# Patient Record
Sex: FEMALE | Race: WHITE | URBAN - METROPOLITAN AREA
[De-identification: names, ages, dates, MRNs, and addresses within clinical notes are randomized per-mention and may not be internally consistent; named-entity substitution may affect disease eponyms.]

---

## 2018-11-27 ENCOUNTER — THERAPY VISIT (OUTPATIENT)
Dept: PHYSICAL THERAPY | Facility: CLINIC | Age: 43
End: 2018-11-27
Payer: COMMERCIAL

## 2018-11-27 DIAGNOSIS — M25.561 RIGHT KNEE PAIN: Primary | ICD-10-CM

## 2018-11-27 DIAGNOSIS — M54.50 LUMBAGO: ICD-10-CM

## 2018-11-27 PROCEDURE — 97161 PT EVAL LOW COMPLEX 20 MIN: CPT | Mod: GP | Performed by: PHYSICAL THERAPIST

## 2018-11-27 PROCEDURE — 97110 THERAPEUTIC EXERCISES: CPT | Mod: GP | Performed by: PHYSICAL THERAPIST

## 2018-11-27 ASSESSMENT — ACTIVITIES OF DAILY LIVING (ADL)
KNEE_ACTIVITY_OF_DAILY_LIVING_SCORE: 77.14
HOW_WOULD_YOU_RATE_THE_OVERALL_FUNCTION_OF_YOUR_KNEE_DURING_YOUR_USUAL_DAILY_ACTIVITIES?: NEARLY NORMAL
RISE FROM A CHAIR: ACTIVITY IS NOT DIFFICULT
HOW_WOULD_YOU_RATE_THE_CURRENT_FUNCTION_OF_YOUR_KNEE_DURING_YOUR_USUAL_DAILY_ACTIVITIES_ON_A_SCALE_FROM_0_TO_100_WITH_100_BEING_YOUR_LEVEL_OF_KNEE_FUNCTION_PRIOR_TO_YOUR_INJURY_AND_0_BEING_THE_INABILITY_TO_PERFORM_ANY_OF_YOUR_USUAL_DAILY_ACTIVITIES?: 70
STIFFNESS: I HAVE THE SYMPTOM BUT IT DOES NOT AFFECT MY ACTIVITY
GIVING WAY, BUCKLING OR SHIFTING OF KNEE: THE SYMPTOM AFFECTS MY ACTIVITY MODERATELY
SWELLING: I HAVE THE SYMPTOM BUT IT DOES NOT AFFECT MY ACTIVITY
PAIN: THE SYMPTOM AFFECTS MY ACTIVITY SLIGHTLY
SIT WITH YOUR KNEE BENT: ACTIVITY IS NOT DIFFICULT
WALK: ACTIVITY IS NOT DIFFICULT
RAW_SCORE: 54
WEAKNESS: THE SYMPTOM AFFECTS MY ACTIVITY MODERATELY
GO UP STAIRS: ACTIVITY IS MINIMALLY DIFFICULT
KNEEL ON THE FRONT OF YOUR KNEE: ACTIVITY IS MINIMALLY DIFFICULT
SQUAT: ACTIVITY IS FAIRLY DIFFICULT
GO DOWN STAIRS: ACTIVITY IS MINIMALLY DIFFICULT
STAND: ACTIVITY IS NOT DIFFICULT
KNEE_ACTIVITY_OF_DAILY_LIVING_SUM: 54
LIMPING: I DO NOT HAVE THE SYMPTOM
AS_A_RESULT_OF_YOUR_KNEE_INJURY,_HOW_WOULD_YOU_RATE_YOUR_CURRENT_LEVEL_OF_DAILY_ACTIVITY?: NEARLY NORMAL

## 2018-11-27 NOTE — LETTER
[facility]  [address]  [facility] [address]  [phone]    2018    Re: Arianna Brock   :   1975  MRN:  [id number]   REFERRING PHYSICIAN:   Dr. Santos DC    [facility]  Date of Initial Evaluation:  2018  Visits:  Rxs Used: 1  Reason for Referral:     Right knee pain  Lumbago    EVALUATION SUMMARY    [facility] Initial Evaluation  Subjective:  Patient is a 42 year old female presenting with rehab right knee hpi and rehab back hpi. The history is provided by the patient. No  was used.   Arianna Brock is a 42 year old female with a right knee condition.  Condition occurred with:  Running.  Condition occurred: during recreation/sport.  This is a chronic condition  Pt reports R knee pain since  - running injury acute onset, pt was seen here for PT which helped, she has not return back to running since then, 1 yr ago her back started to hurt, gradual onset of low back pain, pt was working out last summer when her pain started, she has slowed down since then which has helped with her pain, pt was referred for PT by her chiropractor for lumbopelvic instability, upper crosse syndrome and R knee pain on 10/10/18, pt going to [facility] 2/month for the same, currently reports R knee pain and LBP which has been better since 1 month  Patient reports pain:  Anterior (superior patellar).  Radiates to:  Thigh (posteriorly).  Pain is described as sharp and is intermittent and reported as 4/10 and 5/10.  Associated symptoms:  Edema, loss of strength, loss of motion/stiffness and buckling/giving out. Pain is worse during the day.  Symptoms are exacerbated by ascending stairs, descending stairs, walking and bending/squatting (walknig tony >1 hr)   Since onset symptoms are gradually improving. General health as reported by patient is good.    Current occupation is Admin  assistant  This is a chronic condition     Patient reports pain:  Lower lumbar spine (L>R).  Radiates to:  Gluteals left.  Pain is described as aching and is intermittent and reported as 4/10.  Associated symptoms:  Loss of motion/stiffness. Pain is worse during the night and worse in the P.M. (wakes up occasionally - not everyday).  Symptoms are exacerbated by sitting, bending and standing (sitting tony 45 mins - sitting job of 6-8 hrs, standing tony 45 min) Relieved by: modifiying her activity has helped her pain level   Since onset symptoms are gradually improving. General health as reported by patient is good.  Pertinent medical history includes:  Asthma.              Pertinent medical history includes:  Asthma. Medication history: baclofen.  Current occupation is .  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    Knee Activity of Daily Living Score: 77.14      Re: Arianna Brock   :   1975    Objective:  Lumbar/SI Evaluation  ROM:    AROM Lumbar:   Flexion:          FIS - Able to reach ankles, B hamstring tightness   Ext:                    EIS - End range stretch pain L side    Side Bend:        Left:  L side pain     Right:  No pain   Rotation:           Left:     Right:   Side Glide:        Left:     Right:     Lumbar Myotomes:  normal       Knee Evaluation:  ROM:    AROM  Hyperextension:  Left:  0    Right: 0  Extension:  Left: 0    Right:  10  Flexion: Left: 148    Right: 143    Strength:   Extension:  Left: 5/5   Pain:      Right: 3+/5   Pain:  Quad Set Left: WNL    Pain:   Quad Set Right: Good    Pain:    Palpation:    Left knee tenderness present at:  Medial Joint Line; Lateral Joint Line; Patellar Tendon; Patellar Medial; Patellar Superior and Patellar Inferior  Right knee tenderness present at:  Medial Joint Line; Lateral Joint Line; Patellar Tendon; Patellar Superior and Patellar Inferior    Functional Testing:      Quad:    Single Leg Squat:  Left:         Requires wall supprot   Normal control  Right:      R knee pain  Mild loss of control  Bilateral Leg Squat:  R knee pain, partial ROM only        Assessment/Plan:    Patient is a 42 year old female with lumbar and right side knee complaints.    Patient has the following significant findings with corresponding treatment plan.                Diagnosis 1:  R knee pain, low back pain  Pain -  hot/cold therapy, manual therapy, self management, education, directional preference exercise and home program  Decreased ROM/flexibility - manual therapy, therapeutic exercise and home program  Decreased joint mobility - manual therapy and therapeutic exercise  Decreased strength - therapeutic exercise and therapeutic activities  Impaired muscle performance - neuro re-education  Decreased function - therapeutic activities  Impaired posture - neuro re-education    Re: Arianna Brock   :   1975      Therapy Evaluation Codes:   1) History comprised of:   Personal factors that impact the plan of care:      Time since onset of symptoms.    Comorbidity factors that impact the plan of care are:      Asthma.     Medications impacting care: baclofen.  2) Examination of Body Systems comprised of:   Body structures and functions that impact the plan of care:      Knee and Lumbar spine.   Activity limitations that impact the plan of care are:      Bending, Lifting, Sitting, Stairs, Standing and Walking.  3) Clinical presentation characteristics are:   Stable/Uncomplicated.  4) Decision-Making    Moderate complexity using standardized patient assessment instrument and/or measureable assessment of functional outcome.  Cumulative Therapy Evaluation is: Low complexity.    Previous and current functional limitations:  (See Goal Flow Sheet for this information)    Short term and Long term goals: (See Goal Flow Sheet for this information)     Communication ability:  Patient appears to be able to clearly communicate and understand  verbal and written communication and follow directions correctly.  Treatment Explanation - The following has been discussed with the patient:   RX ordered/plan of care  Anticipated outcomes  Possible risks and side effects  This patient would benefit from PT intervention to resume normal activities.   Rehab potential is excellent.    Frequency:  1 X week, once daily  Duration:  for 8 weeks  Discharge Plan:  Achieve all LTG.  Independent in home treatment program.  Return to previous functional level by discharge.  Reach maximal therapeutic benefit.      Thank you for your referral.    INQUIRIES  Therapist: Suzan Pagan   [facility] [place]  [address]  [facility] [address]  Phone: [phone]  Fax: [phone]

## 2018-11-27 NOTE — PROGRESS NOTES
[facility] Initial Evaluation  Subjective:  Patient is a 42 year old female presenting with rehab right knee hpi and rehab back hpi. The history is provided by the patient. No  was used.   Arianna Brock is a 42 year old female with a right knee condition.  Condition occurred with:  Running.  Condition occurred: during recreation/sport.  This is a chronic condition  Pt reports R knee pain since 2016 - running injury acute onset, pt was seen here for PT which helped, she has not return back to running since then, 1 yr ago her back started to hurt, gradual onset of low back pain, pt was working out last summer when her pain started, she has slowed down since then which has helped with her pain, pt was referred for PT by her chiropractor for lumbopelvic instability, upper crosse syndrome and R knee pain on 10/10/18, pt going to [facility] 2/month for the same, currently reports R knee pain and LBP which has been better since 1 month  .    Patient reports pain:  Anterior (superior patellar).  Radiates to:  Thigh (posteriorly).  Pain is described as sharp and is intermittent and reported as 4/10 and 5/10.  Associated symptoms:  Edema, loss of strength, loss of motion/stiffness and buckling/giving out. Pain is worse during the day.  Symptoms are exacerbated by ascending stairs, descending stairs, walking and bending/squatting (walknig tony >1 hr)   Since onset symptoms are gradually improving.        General health as reported by patient is good.          Current occupation is   .                        This is a chronic condition     Patient reports pain:  Lower lumbar spine (L>R).  Radiates to:  Gluteals left.  Pain is described as aching and is intermittent and reported as 4/10.  Associated symptoms:  Loss of motion/stiffness. Pain is worse during the night and worse in the P.M. (wakes up occasionally - not everyday).  Symptoms are exacerbated by sitting,  bending and standing (sitting tony 45 mins - sitting job of 6-8 hrs, standing tony 45 min) Relieved by: modifiying her activity has helped her pain level   Since onset symptoms are gradually improving.        General health as reported by patient is good.  Pertinent medical history includes:  Asthma.                                            Objective:  System         Lumbar/SI Evaluation  ROM:    AROM Lumbar:   Flexion:          FIS - Able to reach ankles, B hamstring tightness   Ext:                    EIS - End range stretch pain L side    Side Bend:        Left:  L side pain     Right:  No pain   Rotation:           Left:     Right:   Side Glide:        Left:     Right:           Lumbar Myotomes:  normal                                                                   Knee Evaluation:  ROM:    AROM    Hyperextension:  Left:  0    Right: 0  Extension:  Left: 0    Right:  10  Flexion: Left: 148    Right: 143        Strength:     Extension:  Left: 5/5   Pain:      Right: 3+/5   Pain:    Quad Set Left: WNL    Pain:   Quad Set Right: Good    Pain:      Palpation:    Left knee tenderness present at:  Medial Joint Line; Lateral Joint Line; Patellar Tendon; Patellar Medial; Patellar Superior and Patellar Inferior  Right knee tenderness present at:  Medial Joint Line; Lateral Joint Line; Patellar Tendon; Patellar Superior and Patellar Inferior      Functional Testing:          Quad:    Single Leg Squat:  Left:        Requires wall supprot   Normal control  Right:      R knee pain  Mild loss of control  Bilateral Leg Squat:  R knee pain, partial ROM only                General     ROS    Assessment/Plan:    Patient is a 42 year old female with lumbar and right side knee complaints.    Patient has the following significant findings with corresponding treatment plan.                Diagnosis 1:  R knee pain, low back pain  Pain -  hot/cold therapy, manual therapy, self management, education, directional preference exercise  and home program  Decreased ROM/flexibility - manual therapy, therapeutic exercise and home program  Decreased joint mobility - manual therapy and therapeutic exercise  Decreased strength - therapeutic exercise and therapeutic activities  Impaired muscle performance - neuro re-education  Decreased function - therapeutic activities  Impaired posture - neuro re-education    Therapy Evaluation Codes:   1) History comprised of:   Personal factors that impact the plan of care:      Time since onset of symptoms.    Comorbidity factors that impact the plan of care are:      Asthma.     Medications impacting care: baclofen.  2) Examination of Body Systems comprised of:   Body structures and functions that impact the plan of care:      Knee and Lumbar spine.   Activity limitations that impact the plan of care are:      Bending, Lifting, Sitting, Stairs, Standing and Walking.  3) Clinical presentation characteristics are:   Stable/Uncomplicated.  4) Decision-Making    Moderate complexity using standardized patient assessment instrument and/or measureable assessment of functional outcome.  Cumulative Therapy Evaluation is: Low complexity.    Previous and current functional limitations:  (See Goal Flow Sheet for this information)    Short term and Long term goals: (See Goal Flow Sheet for this information)     Communication ability:  Patient appears to be able to clearly communicate and understand verbal and written communication and follow directions correctly.  Treatment Explanation - The following has been discussed with the patient:   RX ordered/plan of care  Anticipated outcomes  Possible risks and side effects  This patient would benefit from PT intervention to resume normal activities.   Rehab potential is excellent.    Frequency:  1 X week, once daily  Duration:  for 8 weeks  Discharge Plan:  Achieve all LTG.  Independent in home treatment program.  Return to previous functional level by discharge.  Reach maximal  therapeutic benefit.    Please refer to the daily flowsheet for treatment today, total treatment time and time spent performing 1:1 timed codes.

## 2018-11-27 NOTE — PROGRESS NOTES
[facility] Initial Evaluation  Subjective:  Patient is a 42 year old female presenting with rehab left ankle/foot hpi.                                      Pertinent medical history includes:  Asthma.      Medication history: baclofen.  Current occupation is .    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                   Knee Activity of Daily Living Score: 77.14            Objective:  System    Physical Exam    General     ROS    Assessment/Plan:

## 2018-12-04 ENCOUNTER — THERAPY VISIT (OUTPATIENT)
Dept: PHYSICAL THERAPY | Facility: CLINIC | Age: 43
End: 2018-12-04
Payer: COMMERCIAL

## 2018-12-04 DIAGNOSIS — M54.50 LUMBAGO: ICD-10-CM

## 2018-12-04 DIAGNOSIS — M25.561 RIGHT KNEE PAIN: ICD-10-CM

## 2018-12-04 PROCEDURE — 97110 THERAPEUTIC EXERCISES: CPT | Mod: GP | Performed by: PHYSICAL THERAPIST

## 2018-12-11 ENCOUNTER — THERAPY VISIT (OUTPATIENT)
Dept: PHYSICAL THERAPY | Facility: CLINIC | Age: 43
End: 2018-12-11
Payer: COMMERCIAL

## 2018-12-11 DIAGNOSIS — M25.561 RIGHT KNEE PAIN: ICD-10-CM

## 2018-12-11 DIAGNOSIS — M54.50 LUMBAGO: ICD-10-CM

## 2018-12-11 PROCEDURE — 97110 THERAPEUTIC EXERCISES: CPT | Mod: GP | Performed by: PHYSICAL THERAPIST

## 2019-03-04 PROBLEM — M54.50 LUMBAGO: Status: RESOLVED | Noted: 2018-11-27 | Resolved: 2019-03-04

## 2019-03-04 PROBLEM — M25.561 RIGHT KNEE PAIN: Status: RESOLVED | Noted: 2018-11-27 | Resolved: 2019-03-04

## 2019-03-04 NOTE — PROGRESS NOTES
Discharge Note        Arianna failed to follow up and current status is unknown.  Please see information below for last relevant information on current status.  Patient seen for 3 visits.    SUBJECTIVE  Subjective changes noted by patient:  The low back is feeling better.  Can bend to get shoes and socks better and having less LBP.  Knee is doing better also.    .  Current pain level is 0/10.     Previous pain level was  4/10.   Changes in function:  Yes (See Goal flowsheet attached for changes in current functional level)  Adverse reaction to treatment or activity: None    OBJECTIVE  Changes noted in objective findings: No LBP today.  REIL with less restriction and no pain.       ASSESSMENT/PLAN  Diagnosis: R knee pain    Updated problem list and treatment plan:   Pain - HEP  Decreased ROM/flexibility - HEP  Decreased strength - HEP  STG/LTGs have been met or progress has been made towards goals:  Yes, please see goal flowsheet for most current information  Assessment of Progress: current status is unknown.    Last current status: Pt is progressing well   Self Management Plans:  HEP  I have re-evaluated this patient and find that the nature, scope, duration and intensity of the therapy is appropriate for the medical condition of the patient.  Arianna continues to require the following intervention to meet STG and LTG's:  HEP.    Recommendations:  Discharge with current home program.  Patient to follow up with MD as needed.    Please refer to the daily flowsheet for treatment today, total treatment time and time spent performing 1:1 timed codes.